# Patient Record
Sex: FEMALE | Race: WHITE | NOT HISPANIC OR LATINO | Employment: UNEMPLOYED | URBAN - METROPOLITAN AREA
[De-identification: names, ages, dates, MRNs, and addresses within clinical notes are randomized per-mention and may not be internally consistent; named-entity substitution may affect disease eponyms.]

---

## 2017-02-07 DIAGNOSIS — C50.919 MALIGNANT NEOPLASM OF FEMALE BREAST, UNSPECIFIED LATERALITY, UNSPECIFIED SITE OF BREAST: Primary | ICD-10-CM

## 2017-02-07 PROBLEM — Z45.2 ENCOUNTER FOR FITTING AND ADJUSTMENT OF VASCULAR CATHETER: Status: ACTIVE | Noted: 2017-02-07

## 2017-02-08 ENCOUNTER — LAB (OUTPATIENT)
Dept: ONCOLOGY | Facility: HOSPITAL | Age: 62
End: 2017-02-08

## 2017-02-08 ENCOUNTER — INFUSION (OUTPATIENT)
Dept: ONCOLOGY | Facility: HOSPITAL | Age: 62
End: 2017-02-08

## 2017-02-08 ENCOUNTER — CONSULT (OUTPATIENT)
Dept: ONCOLOGY | Facility: CLINIC | Age: 62
End: 2017-02-08

## 2017-02-08 VITALS
HEART RATE: 57 BPM | TEMPERATURE: 98.1 F | BODY MASS INDEX: 22.45 KG/M2 | RESPIRATION RATE: 16 BRPM | HEIGHT: 62 IN | WEIGHT: 122 LBS | DIASTOLIC BLOOD PRESSURE: 76 MMHG | SYSTOLIC BLOOD PRESSURE: 122 MMHG | OXYGEN SATURATION: 100 %

## 2017-02-08 DIAGNOSIS — C50.919 MALIGNANT NEOPLASM OF FEMALE BREAST, UNSPECIFIED LATERALITY, UNSPECIFIED SITE OF BREAST: ICD-10-CM

## 2017-02-08 DIAGNOSIS — E53.8 B12 DEFICIENCY: Primary | ICD-10-CM

## 2017-02-08 DIAGNOSIS — Z45.2 ENCOUNTER FOR FITTING AND ADJUSTMENT OF VASCULAR CATHETER: ICD-10-CM

## 2017-02-08 DIAGNOSIS — R97.0 ELEVATED CEA: ICD-10-CM

## 2017-02-08 DIAGNOSIS — C50.912 BILATERAL MALIGNANT NEOPLASM OF BREAST IN FEMALE, UNSPECIFIED SITE OF BREAST: Primary | ICD-10-CM

## 2017-02-08 DIAGNOSIS — C50.412 MALIGNANT NEOPLASM OF UPPER-OUTER QUADRANT OF LEFT FEMALE BREAST (HCC): Primary | ICD-10-CM

## 2017-02-08 DIAGNOSIS — C50.911 BILATERAL MALIGNANT NEOPLASM OF BREAST IN FEMALE, UNSPECIFIED SITE OF BREAST: Primary | ICD-10-CM

## 2017-02-08 LAB
ALBUMIN SERPL-MCNC: 4.4 G/DL (ref 3.5–5.2)
ALBUMIN/GLOB SERPL: 1.6 G/DL
ALP SERPL-CCNC: 61 U/L (ref 39–117)
ALT SERPL W P-5'-P-CCNC: 20 U/L (ref 1–33)
ANION GAP SERPL CALCULATED.3IONS-SCNC: 14 MMOL/L
AST SERPL-CCNC: 23 U/L (ref 1–32)
BASOPHILS # BLD AUTO: 0.07 10*3/MM3 (ref 0–0.2)
BASOPHILS NFR BLD AUTO: 1 % (ref 0–1.5)
BILIRUB SERPL-MCNC: 0.3 MG/DL (ref 0.1–1.2)
BUN BLD-MCNC: 22 MG/DL (ref 8–23)
BUN/CREAT SERPL: 17.9 (ref 7–25)
CALCIUM SPEC-SCNC: 9.6 MG/DL (ref 8.6–10.5)
CEA SERPL-MCNC: 1.14 NG/ML
CHLORIDE SERPL-SCNC: 97 MMOL/L (ref 98–107)
CO2 SERPL-SCNC: 24 MMOL/L (ref 22–29)
CREAT BLD-MCNC: 1.23 MG/DL (ref 0.57–1)
DEPRECATED RDW RBC AUTO: 44.3 FL (ref 37–54)
EOSINOPHIL # BLD AUTO: 0.17 10*3/MM3 (ref 0–0.7)
EOSINOPHIL NFR BLD AUTO: 2.4 % (ref 0.3–6.2)
ERYTHROCYTE [DISTWIDTH] IN BLOOD BY AUTOMATED COUNT: 12.2 % (ref 11.7–13)
GFR SERPL CREATININE-BSD FRML MDRD: 44 ML/MIN/1.73
GLOBULIN UR ELPH-MCNC: 2.8 GM/DL
GLUCOSE BLD-MCNC: 105 MG/DL (ref 65–99)
HCT VFR BLD AUTO: 36.3 % (ref 35.6–45.5)
HGB BLD-MCNC: 12.7 G/DL (ref 11.9–15.5)
IMM GRANULOCYTES # BLD: 0.08 10*3/MM3 (ref 0–0.03)
IMM GRANULOCYTES NFR BLD: 1.1 % (ref 0–0.5)
LYMPHOCYTES # BLD AUTO: 2.89 10*3/MM3 (ref 0.9–4.8)
LYMPHOCYTES NFR BLD AUTO: 40.1 % (ref 19.6–45.3)
MCH RBC QN AUTO: 34.1 PG (ref 26.9–32)
MCHC RBC AUTO-ENTMCNC: 35 G/DL (ref 32.4–36.3)
MCV RBC AUTO: 97.6 FL (ref 80.5–98.2)
MONOCYTES # BLD AUTO: 0.72 10*3/MM3 (ref 0.2–1.2)
MONOCYTES NFR BLD AUTO: 10 % (ref 5–12)
NEUTROPHILS # BLD AUTO: 3.27 10*3/MM3 (ref 1.9–8.1)
NEUTROPHILS NFR BLD AUTO: 45.4 % (ref 42.7–76)
NRBC BLD MANUAL-RTO: 0 /100 WBC (ref 0–0)
PLATELET # BLD AUTO: 202 10*3/MM3 (ref 140–500)
PMV BLD AUTO: 10 FL (ref 6–12)
POTASSIUM BLD-SCNC: 4.5 MMOL/L (ref 3.5–5.2)
PROT SERPL-MCNC: 7.2 G/DL (ref 6–8.5)
RBC # BLD AUTO: 3.72 10*6/MM3 (ref 3.9–5.2)
SODIUM BLD-SCNC: 135 MMOL/L (ref 136–145)
T4 FREE SERPL-MCNC: 1.62 NG/DL (ref 0.93–1.7)
TSH SERPL DL<=0.05 MIU/L-ACNC: 0.74 MIU/ML (ref 0.27–4.2)
VIT B12 BLD-MCNC: 546 PG/ML (ref 211–946)
WBC NRBC COR # BLD: 7.2 10*3/MM3 (ref 4.5–10.7)

## 2017-02-08 PROCEDURE — 84443 ASSAY THYROID STIM HORMONE: CPT | Performed by: INTERNAL MEDICINE

## 2017-02-08 PROCEDURE — 25010000002 CYANOCOBALAMIN PER 1000 MCG: Performed by: INTERNAL MEDICINE

## 2017-02-08 PROCEDURE — 36415 COLL VENOUS BLD VENIPUNCTURE: CPT

## 2017-02-08 PROCEDURE — 82607 VITAMIN B-12: CPT | Performed by: INTERNAL MEDICINE

## 2017-02-08 PROCEDURE — 96372 THER/PROPH/DIAG INJ SC/IM: CPT | Performed by: INTERNAL MEDICINE

## 2017-02-08 PROCEDURE — 99205 OFFICE O/P NEW HI 60 MIN: CPT | Performed by: INTERNAL MEDICINE

## 2017-02-08 PROCEDURE — 25010000002 HEPARIN FLUSH (PORCINE) 100 UNIT/ML SOLUTION: Performed by: INTERNAL MEDICINE

## 2017-02-08 PROCEDURE — 85025 COMPLETE CBC W/AUTO DIFF WBC: CPT | Performed by: INTERNAL MEDICINE

## 2017-02-08 PROCEDURE — 80053 COMPREHEN METABOLIC PANEL: CPT | Performed by: INTERNAL MEDICINE

## 2017-02-08 PROCEDURE — 82378 CARCINOEMBRYONIC ANTIGEN: CPT | Performed by: INTERNAL MEDICINE

## 2017-02-08 PROCEDURE — 84439 ASSAY OF FREE THYROXINE: CPT | Performed by: INTERNAL MEDICINE

## 2017-02-08 PROCEDURE — 96523 IRRIG DRUG DELIVERY DEVICE: CPT | Performed by: INTERNAL MEDICINE

## 2017-02-08 RX ORDER — CYANOCOBALAMIN 1000 UG/ML
1000 INJECTION, SOLUTION INTRAMUSCULAR; SUBCUTANEOUS ONCE
Status: COMPLETED | OUTPATIENT
Start: 2017-02-08 | End: 2017-02-08

## 2017-02-08 RX ORDER — HYDROCODONE BITARTRATE AND ACETAMINOPHEN 5; 325 MG/1; MG/1
TABLET ORAL
COMMUNITY
Start: 2017-02-07 | End: 2018-02-14

## 2017-02-08 RX ORDER — SODIUM CHLORIDE 0.9 % (FLUSH) 0.9 %
10 SYRINGE (ML) INJECTION AS NEEDED
Status: DISCONTINUED | OUTPATIENT
Start: 2017-02-08 | End: 2017-02-08 | Stop reason: HOSPADM

## 2017-02-08 RX ORDER — METOPROLOL TARTRATE 50 MG/1
TABLET, FILM COATED ORAL
Refills: 0 | COMMUNITY
Start: 2016-12-12

## 2017-02-08 RX ORDER — CYANOCOBALAMIN 1000 UG/ML
1000 INJECTION, SOLUTION INTRAMUSCULAR; SUBCUTANEOUS ONCE
Status: CANCELLED | OUTPATIENT
Start: 2017-03-08

## 2017-02-08 RX ORDER — LISINOPRIL AND HYDROCHLOROTHIAZIDE 12.5; 1 MG/1; MG/1
TABLET ORAL
COMMUNITY
Start: 2016-11-17

## 2017-02-08 RX ADMIN — SODIUM CHLORIDE, PRESERVATIVE FREE 500 UNITS: 5 INJECTION INTRAVENOUS at 14:35

## 2017-02-08 RX ADMIN — CYANOCOBALAMIN 1000 MCG: 1000 INJECTION, SOLUTION INTRAMUSCULAR; SUBCUTANEOUS at 14:57

## 2017-02-08 RX ADMIN — Medication 10 ML: at 14:35
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
## 2017-02-09 ENCOUNTER — DOCUMENTATION (OUTPATIENT)
Dept: ONCOLOGY | Facility: CLINIC | Age: 62
End: 2017-02-09
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
## 2017-03-02 ENCOUNTER — TELEPHONE (OUTPATIENT)
Dept: ONCOLOGY | Facility: CLINIC | Age: 62
End: 2017-03-02

## 2017-03-02 RX ORDER — PROCHLORPERAZINE MALEATE 10 MG
10 TABLET ORAL EVERY 6 HOURS PRN
Qty: 30 TABLET | Refills: 1 | Status: SHIPPED | OUTPATIENT
Start: 2017-03-02 | End: 2017-04-12

## 2017-03-02 NOTE — TELEPHONE ENCOUNTER
----- Message from Chantelle Bradley sent at 3/2/2017  9:01 AM EST -----   Pt is also having stomach pain and nausea and wants to know if MD would call something in for her        [phone]

## 2017-03-02 NOTE — TELEPHONE ENCOUNTER
----- Message from Chantelle Bradley sent at 3/2/2017  8:58 AM EST -----   Pt says she had an abnormal Mammogram and was sent a letter for additional testing done        [phone]

## 2017-03-02 NOTE — TELEPHONE ENCOUNTER
Pt states she is having some problems with nausea and stomach discomfort.  Started about 2 days a go.  No vomiting, but feels if she could vomit she would feel better.  Afebrile. Having normal bm's.  Denies chills, or aches.   Does have some lower back pain which isn't anything new.  Has a hx of lower back pain.  Pt. Is finished with all her treatments including a yr of herceptin.  Requesting something for pain and nausea.  States she also had a mammogram which she was told was abnormal and additional testing needed to be done.  Informed pt. We do not have the mammogram report.  All d/w dr. Taylor, pt. Instructed to get a otc probiotic to take daily.  Will e-scribe pt. Compazine to [facility] in [place].  Suggest she contact her pcp for the chronic back pain.  Will make sure dr. Taylor sees the mammogram when it comes over.  Has appt with dr. Taylor on 4/12/17.

## 2017-03-13 ENCOUNTER — TELEPHONE (OUTPATIENT)
Dept: ONCOLOGY | Facility: HOSPITAL | Age: 62
End: 2017-03-13

## 2017-03-13 DIAGNOSIS — R92.8 ABNORMAL MAMMOGRAM: Primary | ICD-10-CM

## 2017-03-13 NOTE — TELEPHONE ENCOUNTER
----- Message from Jennie Christiansen sent at 3/13/2017  9:38 AM EDT -----  Contact: [phone] [facility]   Pt needs scheduled for additional views mammo.      Spoke with [facility]. Pt has not been back for follow up on abnormal mammogram. [facility] states pt needs additional views. Orders placed. Message sent to scheduling to call jose juan Rubio.

## 2017-03-23 ENCOUNTER — TELEPHONE (OUTPATIENT)
Dept: ONCOLOGY | Facility: HOSPITAL | Age: 62
End: 2017-03-23

## 2017-03-23 NOTE — TELEPHONE ENCOUNTER
Patient calling to see if Dr Taylor received mammogram results from two days ago? Pt says radiologist/PCP ordered a biopsy because of it, but she didn't know why we weren't ordering it.   I told her it doesn't look like we received it. Patient will call [facility] and have them fax us mammogram results. If Dr Taylor could review tomorrow when we receive them so we can let patient know what Dr Taylor thinks about it? Biopsy is scheduled next week.  Also, patient has issues with nausea. We had prescribed compazine for her but she said it didn't help and she didn't like the way it made her feel. Patient thinks the nausea could be related to her nerves from all of this testing. She has some anxiety. Should we prescribe her something different for nausea or anxiety medication? Or should she discuss with PCP?   [employer] message sent to Dr Taylor.

## 2017-03-23 NOTE — TELEPHONE ENCOUNTER
----- Message from Vinicius Calderón sent at 3/23/2017 12:25 PM EDT -----  Contact: self  Pt is calling because her pcp ordered mamm a t back and seen that they seen  something on it . she was thinking that dr oneil was ordering everything for her.        [phone]

## 2017-03-27 ENCOUNTER — TELEPHONE (OUTPATIENT)
Dept: ONCOLOGY | Facility: HOSPITAL | Age: 62
End: 2017-03-27

## 2017-03-27 DIAGNOSIS — C50.412 MALIGNANT NEOPLASM OF UPPER-OUTER QUADRANT OF LEFT FEMALE BREAST (HCC): Primary | ICD-10-CM

## 2017-03-27 RX ORDER — ONDANSETRON 4 MG/1
4 TABLET, FILM COATED ORAL EVERY 6 HOURS
Qty: 30 TABLET | Refills: 3 | Status: SHIPPED | OUTPATIENT
Start: 2017-03-27 | End: 2018-02-14

## 2017-03-27 NOTE — TELEPHONE ENCOUNTER
Called and spoke with patient to let her know we are working on getting results. Also, let her know we prescribed zofran for her nausea. She v/u Patient will have b/x this week.  Message forwarded to Marianne to obtain results.

## 2017-03-27 NOTE — TELEPHONE ENCOUNTER
----- Message from Ifeanyi Taylor MD sent at 3/24/2017  5:01 PM EDT -----  I haven't seen the diagnostic mammogram result.  Can rx zofran 4mg q6h prn #30 with three refills.  Might talk to Marianne Salgado about getting the diagnostic mammo result but if a bx has already been ordered then that's all that's needed.  Thanks,  [facility]    ----- Message -----     From: Alexsandra Jarrett RN     Sent: 3/23/2017   3:03 PM       To: MD Dr Brandon Plummer, Patient calling to see if you received mammogram results from two days ago? Pt says radiologist/PCP ordered a biopsy because of it, but she didn't know why you weren't ordering it.   I told her it doesn't look like we received it. Patient will call [facility] and have them fax us mammogram results. If you could review tomorrow when we receive them so we can let patient know what you think about it? Biopsy is scheduled next week.  Also, patient has issues with nausea. We had prescribed compazine for her but she said it didn't help and she didn't like the way it made her feel. Patient thinks the nausea could be related to her nerves from all of this testing. She has some anxiety. Should we prescribe her something different for nausea or anxiety medication? Or should she discuss with PCP?

## 2017-04-04 ENCOUNTER — TELEPHONE (OUTPATIENT)
Dept: ONCOLOGY | Facility: CLINIC | Age: 62
End: 2017-04-04

## 2017-04-04 NOTE — TELEPHONE ENCOUNTER
----- Message from Marianne Salgado sent at 4/4/2017  8:29 AM EDT -----  Contact: self   Patient calling to get path results from biopsy and also is overdue for port flush    Patient notified per Dr. Taylor that biopsy results WNL.  Port flush added to next weeks appt.  She has further questions regarding scans that she will review with Dr. Taylor next week at appt.

## 2017-04-12 ENCOUNTER — OFFICE VISIT (OUTPATIENT)
Dept: ONCOLOGY | Facility: CLINIC | Age: 62
End: 2017-04-12

## 2017-04-12 ENCOUNTER — APPOINTMENT (OUTPATIENT)
Dept: OTHER | Facility: HOSPITAL | Age: 62
End: 2017-04-12

## 2017-04-12 ENCOUNTER — INFUSION (OUTPATIENT)
Dept: ONCOLOGY | Facility: HOSPITAL | Age: 62
End: 2017-04-12

## 2017-04-12 VITALS
HEART RATE: 82 BPM | RESPIRATION RATE: 12 BRPM | BODY MASS INDEX: 24.55 KG/M2 | DIASTOLIC BLOOD PRESSURE: 79 MMHG | HEIGHT: 62 IN | SYSTOLIC BLOOD PRESSURE: 126 MMHG | WEIGHT: 133.4 LBS | OXYGEN SATURATION: 99 % | TEMPERATURE: 98 F

## 2017-04-12 DIAGNOSIS — N28.1 RENAL CYST: ICD-10-CM

## 2017-04-12 DIAGNOSIS — Z45.2 ENCOUNTER FOR FITTING AND ADJUSTMENT OF VASCULAR CATHETER: Primary | ICD-10-CM

## 2017-04-12 DIAGNOSIS — C50.412 MALIGNANT NEOPLASM OF UPPER-OUTER QUADRANT OF LEFT FEMALE BREAST (HCC): Primary | ICD-10-CM

## 2017-04-12 DIAGNOSIS — E53.8 B12 DEFICIENCY: ICD-10-CM

## 2017-04-12 PROCEDURE — 25010000002 HEPARIN FLUSH (PORCINE) 100 UNIT/ML SOLUTION: Performed by: INTERNAL MEDICINE

## 2017-04-12 PROCEDURE — 96372 THER/PROPH/DIAG INJ SC/IM: CPT | Performed by: INTERNAL MEDICINE

## 2017-04-12 PROCEDURE — 99213 OFFICE O/P EST LOW 20 MIN: CPT | Performed by: INTERNAL MEDICINE

## 2017-04-12 PROCEDURE — 25010000002 CYANOCOBALAMIN PER 1000 MCG: Performed by: INTERNAL MEDICINE

## 2017-04-12 RX ORDER — ROPINIROLE 0.5 MG/1
TABLET, FILM COATED ORAL
Refills: 0 | COMMUNITY
Start: 2017-02-02 | End: 2017-04-12

## 2017-04-12 RX ORDER — ATORVASTATIN CALCIUM 10 MG/1
20 TABLET, FILM COATED ORAL
COMMUNITY
Start: 2017-04-10

## 2017-04-12 RX ORDER — CYANOCOBALAMIN 1000 UG/ML
1000 INJECTION, SOLUTION INTRAMUSCULAR; SUBCUTANEOUS ONCE
Status: COMPLETED | OUTPATIENT
Start: 2017-04-12 | End: 2017-04-12

## 2017-04-12 RX ORDER — CYANOCOBALAMIN 1000 UG/ML
1000 INJECTION, SOLUTION INTRAMUSCULAR; SUBCUTANEOUS ONCE
Status: CANCELLED | OUTPATIENT
Start: 2017-05-03

## 2017-04-12 RX ORDER — SODIUM CHLORIDE 0.9 % (FLUSH) 0.9 %
10 SYRINGE (ML) INJECTION AS NEEDED
Status: DISCONTINUED | OUTPATIENT
Start: 2017-04-12 | End: 2017-04-12 | Stop reason: HOSPADM

## 2017-04-12 RX ORDER — NITROGLYCERIN 0.4 MG/1
TABLET SUBLINGUAL
COMMUNITY
Start: 2015-10-14

## 2017-04-12 RX ADMIN — Medication 10 ML: at 14:18

## 2017-04-12 RX ADMIN — CYANOCOBALAMIN 1000 MCG: 1000 INJECTION, SOLUTION INTRAMUSCULAR; SUBCUTANEOUS at 14:15

## 2017-04-12 RX ADMIN — SODIUM CHLORIDE, PRESERVATIVE FREE 500 UNITS: 5 INJECTION INTRAVENOUS at 14:19

## 2017-04-12 NOTE — PROGRESS NOTES
[facility] GROUP OUTPATIENT FOLLOW UP CLINIC VISIT    REASON FOR FOLLOW-UP:    1.  History of stage IIB ER/MI negative, HER-2 overexpressed ductal carcinoma of the left breast with 1 node positive for a micrometastasis. Following lumpectomy, she completed adjuvant radiation and three cycles chemotherapy with Taxotere, carboplatin, and Herceptin and subsequently completed 1 year of Herceptin. She was followed by Dr. David Harding with the [facility].  2.  Abnormal mammogram at [facility] on 2/23/2017 with diagnostic imaging recommended.  Diagnostic left mammogram performed on 3/21/2017.  Some architectural distortion seen in the upper outer left breast with 3 groupings of calcifications within the lumpectomy bed.  Biopsy was recommended and performed showing fat necrosis.  3.  Persistently elevated CEA level on prior labs at [place].  CEA was normal here at 1.14 on 2/8/2017.      HISTORY OF PRESENT ILLNESS:  Pricilla Molina is a 62 y.o. female who returns today for follow up of the above issue.  She had breast imaging performed within the past couple of months that showed some calcifications at the lumpectomy site.  Ultimately a biopsy was performed showing fat necrosis.    She was quite anxious regarding her abnormal imaging and subsequent biopsy.  She is feeling better.  She continues to have a little bit of low back pain but this is improved.  She has been having some nausea and dyspepsia.  She was intolerant of Compazine but ondansetron did help her.  She has not tried an H2 blocker.  She does take occasional Tums.  She states that her symptoms are overall much better.        ONCOLOGIC HISTORY:  She had diagnostic left breast imaging on 10/9/2015 that showed a 1.9 cm mass in the upper outer quadrant of the left breast. A biopsy was performed on 10/19/2015 showing invasive ductal carcinoma, grade 3 and poorly differentiated. Estrogen receptor and progesterone  receptor were negative with a Ki-67 of 30%. HER-2 was overexpressed by FISH. On 10/29/2015 she had a left breast lumpectomy with sentinel lymph node biopsy. This showed a 22 mm grade 3 poorly differentiated invasive ductal carcinoma with a microscopic focus of high-grade DCIS noted. Lymphovascular invasion negative. Three lymph nodes examined with one lymph node positive for a micrometastasis. pT2, pN1 (mi). On 11/11/2015 Mediport was placed by Dr. Casie Abrams. On 12/3/2015 she initiated adjuvant chemotherapy with Taxotere, carboplatin, and Herceptin. She was hospitalized at [facility] for dehydration, hypotension, and acute kidney injury from 12/14/2015 through 12/15/2015. Carboplatin dose was decreased by 20%. She received cycle #3 on 1/19/2016. She was admitted to [facility] on 1/26/2016 through 2/1/2016. She completed radiation from 4/27/2016 through 6/14/2016. From 7/17/2016 through 7/19/2016 she was admitted to [facility] for chest pain and suicidal ideation. She had a heart catheterization that showed moderate coronary artery disease in the mid LAD of 60-70% and severity. On 7/22/2016 she had CT imaging of the chest abdomen and pelvis that showed no evidence for metastatic disease. She completed Herceptin on 11/15/2016. CT imaging on 12/7/2016 showed no evidence for metastatic disease. A lesion in the left upper renal pole was noted, suspected to represent a hemorrhagic cyst.  This had decreased in size from 1.9 cm to 1.2 cm.    She completed Herceptin as of 12/13/2016.    She had a bone scan on 12/30/2016 that showed no evidence for metastatic disease.     PAST MEDICAL, SURGICAL, FAMILY, AND SOCIAL HISTORIES were reviewed with the patient and in the electronic medical record, and were updated if indicated.    ALLERGIES:  Allergies   Allergen Reactions   • Codeine Nausea Only     STOMACH ACHE   • Morphine Hives   • Sulfa Antibiotics  "Other (See Comments)     AS A CHILD       MEDICATIONS:  The medication list has been reviewed with the patient by the medical assistant, and the list has been updated in the electronic medical record, which I reviewed.  Medication dosages and frequencies were confirmed to be accurate.    REVIEW OF SYSTEMS:  PAIN:  See Vital Signs below.  GENERAL:  No fevers, chills, night sweats, or unintended weight loss.  SKIN:  No rashes or non-healing lesions.  HEME/LYMPH:  No abnormal bleeding.  No palpable lymphadenopathy.  EYES:  No vision changes or diplopia.  ENT:  No sore throat or difficulty swallowing.  RESPIRATORY:  No cough, shortness of breath, hemoptysis, or wheezing.  CARDIOVASCULAR:  No chest pain, palpitations, orthopnea, or dyspnea on exertion.  GASTROINTESTINAL:  See history of present illness  GENITOURINARY:  No dysuria or hematuria.  MUSCULOSKELETAL:  Mild back pain which is improving  NEUROLOGIC:  No dizziness, loss of consciousness, or seizures.  PSYCHIATRIC:  Anxiety which has improved    Vitals:    04/12/17 1343   BP: 126/79   Pulse: 82   Resp: 12   Temp: 98 °F (36.7 °C)   SpO2: 99%   Weight: 133 lb 6.4 oz (60.5 kg)   Height: 62.2\" (158 cm)   PainSc:   1   PainLoc: Head       PHYSICAL EXAMINATION:  GENERAL:  Well-developed well-nourished female; awake, alert and oriented, in no acute distress.  SKIN:  Warm and dry, without rashes, purpura, or petechiae.  HEAD:  Normocephalic, atraumatic.  EYES:  Pupils equal, round and reactive to light.  Extraocular movements intact.  Conjunctivae normal.  EARS:  Hearing intact.  NOSE:  Septum midline.  No excoriations or nasal discharge.  MOUTH:  No stomatitis or ulcers.  Lips are normal.  THROAT:  Oropharynx without lesions or exudates.  NECK:  Supple with good range of motion; no thyromegaly or masses; no JVD or bruits.  LYMPHATICS:  No cervical, supraclavicular, axillary, or inguinal lymphadenopathy.  CHEST:  Lungs are clear to auscultation bilaterally.  No wheezes, " rales, or rhonchi.  A Mediport is present as benign in appearance.  HEART:  Regular rate; normal rhythm.  No murmurs, gallops or rubs.  ABDOMEN:  Soft, non-tender, non-distended.  Normal active bowel sounds.  No organomegaly.  EXTREMITIES:  No clubbing, cyanosis, or edema.  NEUROLOGICAL:  No focal neurologic deficits.  Breasts not examined today    DIAGNOSTIC DATA:  Lab Results   Component Value Date    WBC 7.20 02/08/2017    HGB 12.7 02/08/2017    HCT 36.3 02/08/2017    MCV 97.6 02/08/2017     02/08/2017       IMAGING:  None reviewed    ASSESSMENT:  This is a 62 y.o. female with:  1. History of stage IIB hormone receptor negative HER-2 overexpressed invasive ductal carcinoma of the left breast she had a lumpectomy with sentinel lymph node biopsy. She completed radiation therapy. She completed 3 cycles of adjuvant chemotherapy with Taxotere and carboplatin with Herceptin before discontinuing the medication due to poor tolerance. She subsequently completed 1 year of Herceptin as of 12/13/2016. No antiestrogen therapy is indicated. She had her therapy at the [facility]. She has transferred her care to our group for ongoing monitoring.  We do not plan for any routine surveillance imaging but see below.  2.  Persistently elevated CEA level: These were obtained all she was on therapy.  I believe these were falsely elevated perhaps due to therapy as her CEA is normal now.  3.  Small left renal mass decreasing in size on her last imaging.  Significance undetermined.  We will obtain CT imaging of the abdomen with renal protocol in about 3 months before I see her next.  She states that she is intolerant of MRIs.  4.  Anxiety, improving  5.  Recent abnormalities on breast imaging resulting in a biopsy of the left breast showing fat necrosis.  We will obtain a repeat diagnostic mammogram of the left breast in 6 months.  6.  Mediport in the right subclavian area: She was to have this removed by Dr. Jason  Aliza but canceled this secondary to the abnormal breast imaging and biopsy.  We will flush her port today.  I advised her to follow-up with Dr. Abrams to have this removed.  If this cannot be removed in the next 4-6 weeks she will again return for a port flush.  7.  Dyspepsia: She will start an H2 blocker if necessary.    PLAN:  1.  Port flush today.  She will see Dr. Abrams to have this removed.  2.  CT abdomen with renal protocol in about 3 months.  This can be done at [facility] if desired.  3.  I will see her back in 3 months for follow-up with labs and a breast examination to review CT imaging.  4.  Diagnostic imaging of the left breast in about 6 months.

## 2017-06-06 ENCOUNTER — INFUSION (OUTPATIENT)
Dept: ONCOLOGY | Facility: HOSPITAL | Age: 62
End: 2017-06-06

## 2017-06-06 DIAGNOSIS — E53.8 B12 DEFICIENCY: ICD-10-CM

## 2017-06-06 DIAGNOSIS — Z45.2 ENCOUNTER FOR FITTING AND ADJUSTMENT OF VASCULAR CATHETER: Primary | ICD-10-CM

## 2017-06-06 PROCEDURE — 96372 THER/PROPH/DIAG INJ SC/IM: CPT | Performed by: NURSE PRACTITIONER

## 2017-06-06 PROCEDURE — 25010000002 HEPARIN FLUSH (PORCINE) 100 UNIT/ML SOLUTION: Performed by: NURSE PRACTITIONER

## 2017-06-06 PROCEDURE — 25010000002 CYANOCOBALAMIN PER 1000 MCG: Performed by: NURSE PRACTITIONER

## 2017-06-06 RX ORDER — SODIUM CHLORIDE 0.9 % (FLUSH) 0.9 %
10 SYRINGE (ML) INJECTION AS NEEDED
Status: DISCONTINUED | OUTPATIENT
Start: 2017-06-06 | End: 2017-06-06 | Stop reason: HOSPADM

## 2017-06-06 RX ORDER — CYANOCOBALAMIN 1000 UG/ML
1000 INJECTION, SOLUTION INTRAMUSCULAR; SUBCUTANEOUS ONCE
Status: CANCELLED | OUTPATIENT
Start: 2017-06-06

## 2017-06-06 RX ORDER — CYANOCOBALAMIN 1000 UG/ML
1000 INJECTION, SOLUTION INTRAMUSCULAR; SUBCUTANEOUS ONCE
Status: COMPLETED | OUTPATIENT
Start: 2017-06-06 | End: 2017-06-06

## 2017-06-06 RX ADMIN — SODIUM CHLORIDE, PRESERVATIVE FREE 500 UNITS: 5 INJECTION INTRAVENOUS at 09:56

## 2017-06-06 RX ADMIN — CYANOCOBALAMIN 1000 MCG: 1000 INJECTION, SOLUTION INTRAMUSCULAR; SUBCUTANEOUS at 10:09

## 2017-06-06 RX ADMIN — Medication 10 ML: at 09:56

## 2017-07-14 ENCOUNTER — HOSPITAL ENCOUNTER (OUTPATIENT)
Dept: CT IMAGING | Facility: HOSPITAL | Age: 62
Discharge: HOME | End: 2017-07-14
Attending: INTERNAL MEDICINE | Admitting: INTERNAL MEDICINE

## 2017-07-14 ENCOUNTER — INFUSION (OUTPATIENT)
Dept: ONCOLOGY | Facility: HOSPITAL | Age: 62
End: 2017-07-14

## 2017-07-14 DIAGNOSIS — E53.8 B12 DEFICIENCY: ICD-10-CM

## 2017-07-14 DIAGNOSIS — N28.1 RENAL CYST: ICD-10-CM

## 2017-07-14 DIAGNOSIS — Z45.2 ENCOUNTER FOR FITTING AND ADJUSTMENT OF VASCULAR CATHETER: Primary | ICD-10-CM

## 2017-07-14 DIAGNOSIS — C50.412 MALIGNANT NEOPLASM OF UPPER-OUTER QUADRANT OF LEFT FEMALE BREAST (HCC): ICD-10-CM

## 2017-07-14 LAB
ALBUMIN SERPL-MCNC: 4.3 G/DL (ref 3.5–5.2)
ALBUMIN/GLOB SERPL: 1.7 G/DL
ALP SERPL-CCNC: 55 U/L (ref 39–117)
ALT SERPL W P-5'-P-CCNC: 22 U/L (ref 1–33)
ANION GAP SERPL CALCULATED.3IONS-SCNC: 12.4 MMOL/L
AST SERPL-CCNC: 26 U/L (ref 1–32)
BASOPHILS # BLD AUTO: 0.05 10*3/MM3 (ref 0–0.2)
BASOPHILS NFR BLD AUTO: 1.1 % (ref 0–1.5)
BILIRUB SERPL-MCNC: 0.4 MG/DL (ref 0.1–1.2)
BUN BLD-MCNC: 19 MG/DL (ref 8–23)
BUN/CREAT SERPL: 16.7 (ref 7–25)
CALCIUM SPEC-SCNC: 9.4 MG/DL (ref 8.6–10.5)
CHLORIDE SERPL-SCNC: 101 MMOL/L (ref 98–107)
CO2 SERPL-SCNC: 23.6 MMOL/L (ref 22–29)
CREAT BLD-MCNC: 1.14 MG/DL (ref 0.57–1)
CREAT BLDA-MCNC: 1.1 MG/DL (ref 0.6–1.3)
DEPRECATED RDW RBC AUTO: 47.6 FL (ref 37–54)
EOSINOPHIL # BLD AUTO: 0.22 10*3/MM3 (ref 0–0.7)
EOSINOPHIL NFR BLD AUTO: 4.8 % (ref 0.3–6.2)
ERYTHROCYTE [DISTWIDTH] IN BLOOD BY AUTOMATED COUNT: 12.9 % (ref 11.7–13)
GFR SERPL CREATININE-BSD FRML MDRD: 48 ML/MIN/1.73
GLOBULIN UR ELPH-MCNC: 2.6 GM/DL
GLUCOSE BLD-MCNC: 98 MG/DL (ref 65–99)
HCT VFR BLD AUTO: 34.3 % (ref 35.6–45.5)
HGB BLD-MCNC: 11.7 G/DL (ref 11.9–15.5)
IMM GRANULOCYTES # BLD: 0.01 10*3/MM3 (ref 0–0.03)
IMM GRANULOCYTES NFR BLD: 0.2 % (ref 0–0.5)
LYMPHOCYTES # BLD AUTO: 2.3 10*3/MM3 (ref 0.9–4.8)
LYMPHOCYTES NFR BLD AUTO: 49.7 % (ref 19.6–45.3)
MCH RBC QN AUTO: 34.1 PG (ref 26.9–32)
MCHC RBC AUTO-ENTMCNC: 34.1 G/DL (ref 32.4–36.3)
MCV RBC AUTO: 100 FL (ref 80.5–98.2)
MONOCYTES # BLD AUTO: 0.56 10*3/MM3 (ref 0.2–1.2)
MONOCYTES NFR BLD AUTO: 12.1 % (ref 5–12)
NEUTROPHILS # BLD AUTO: 1.49 10*3/MM3 (ref 1.9–8.1)
NEUTROPHILS NFR BLD AUTO: 32.1 % (ref 42.7–76)
NRBC BLD MANUAL-RTO: 0 /100 WBC (ref 0–0)
PLATELET # BLD AUTO: 169 10*3/MM3 (ref 140–500)
PMV BLD AUTO: 11.1 FL (ref 6–12)
POTASSIUM BLD-SCNC: 4.7 MMOL/L (ref 3.5–5.2)
PROT SERPL-MCNC: 6.9 G/DL (ref 6–8.5)
RBC # BLD AUTO: 3.43 10*6/MM3 (ref 3.9–5.2)
SODIUM BLD-SCNC: 137 MMOL/L (ref 136–145)
WBC NRBC COR # BLD: 4.63 10*3/MM3 (ref 4.5–10.7)

## 2017-07-14 PROCEDURE — 25010000002 CYANOCOBALAMIN PER 1000 MCG: Performed by: INTERNAL MEDICINE

## 2017-07-14 PROCEDURE — 80053 COMPREHEN METABOLIC PANEL: CPT | Performed by: INTERNAL MEDICINE

## 2017-07-14 PROCEDURE — 96372 THER/PROPH/DIAG INJ SC/IM: CPT | Performed by: INTERNAL MEDICINE

## 2017-07-14 PROCEDURE — 74170 CT ABD WO CNTRST FLWD CNTRST: CPT

## 2017-07-14 PROCEDURE — 85025 COMPLETE CBC W/AUTO DIFF WBC: CPT | Performed by: INTERNAL MEDICINE

## 2017-07-14 PROCEDURE — 0 DIATRIZOATE MEGLUMINE & SODIUM PER 1 ML: Performed by: INTERNAL MEDICINE

## 2017-07-14 PROCEDURE — 82565 ASSAY OF CREATININE: CPT

## 2017-07-14 PROCEDURE — 0 IOPAMIDOL 61 % SOLUTION: Performed by: INTERNAL MEDICINE

## 2017-07-14 RX ORDER — SODIUM CHLORIDE 0.9 % (FLUSH) 0.9 %
10 SYRINGE (ML) INJECTION AS NEEDED
Status: DISCONTINUED | OUTPATIENT
Start: 2017-07-14 | End: 2017-07-14 | Stop reason: HOSPADM

## 2017-07-14 RX ORDER — CYANOCOBALAMIN 1000 UG/ML
1000 INJECTION, SOLUTION INTRAMUSCULAR; SUBCUTANEOUS ONCE
OUTPATIENT
Start: 2017-07-14

## 2017-07-14 RX ORDER — CYANOCOBALAMIN 1000 UG/ML
1000 INJECTION, SOLUTION INTRAMUSCULAR; SUBCUTANEOUS ONCE
Status: COMPLETED | OUTPATIENT
Start: 2017-07-14 | End: 2017-07-14

## 2017-07-14 RX ADMIN — IOPAMIDOL 85 ML: 612 INJECTION, SOLUTION INTRAVENOUS at 11:57

## 2017-07-14 RX ADMIN — DIATRIZOATE MEGLUMINE AND DIATRIZOATE SODIUM 30 ML: 660; 100 LIQUID ORAL; RECTAL at 10:30

## 2017-07-14 RX ADMIN — CYANOCOBALAMIN 1000 MCG: 1000 INJECTION, SOLUTION INTRAMUSCULAR; SUBCUTANEOUS at 11:22

## 2017-07-14 RX ADMIN — Medication 10 ML: at 11:20

## 2017-07-14 NOTE — PROGRESS NOTES
Pt here to have [facility] accessed for CT scan.  Pt is scheduled to see Dr. Taylor next week and wanting to know if I can go ahead and draw labs since [facility]?  I explained to patient that I will draw labs and let Dr. Taylor know- most likely this will be ok to use for next week, but possibility he will want them again if something is abnormal.  She v/u.  Labs drawn and message on appt line for next week to verify with Dr. Taylor.  Pt also requesting her vitamin B12 injection today.  There is an order already in for this and she is due for this per Dr. Milan dictation.  Vitamin B12 injection given today.

## 2017-07-19 ENCOUNTER — APPOINTMENT (OUTPATIENT)
Dept: OTHER | Facility: HOSPITAL | Age: 62
End: 2017-07-19

## 2017-07-19 ENCOUNTER — OFFICE VISIT (OUTPATIENT)
Dept: ONCOLOGY | Facility: CLINIC | Age: 62
End: 2017-07-19

## 2017-07-19 ENCOUNTER — APPOINTMENT (OUTPATIENT)
Dept: ONCOLOGY | Facility: CLINIC | Age: 62
End: 2017-07-19

## 2017-07-19 VITALS
OXYGEN SATURATION: 100 % | HEART RATE: 56 BPM | HEIGHT: 62 IN | DIASTOLIC BLOOD PRESSURE: 74 MMHG | WEIGHT: 134 LBS | BODY MASS INDEX: 24.66 KG/M2 | TEMPERATURE: 98.2 F | RESPIRATION RATE: 12 BRPM | SYSTOLIC BLOOD PRESSURE: 145 MMHG

## 2017-07-19 DIAGNOSIS — Z85.3 HISTORY OF BREAST CANCER: Primary | ICD-10-CM

## 2017-07-19 PROCEDURE — 99214 OFFICE O/P EST MOD 30 MIN: CPT | Performed by: INTERNAL MEDICINE

## 2017-07-19 PROCEDURE — G0463 HOSPITAL OUTPT CLINIC VISIT: HCPCS | Performed by: INTERNAL MEDICINE

## 2017-07-19 NOTE — PROGRESS NOTES
[facility] GROUP OUTPATIENT FOLLOW UP CLINIC VISIT    REASON FOR FOLLOW-UP:    1.  History of stage IIB ER/IN negative, HER-2 overexpressed ductal carcinoma of the left breast with 1 node positive for a micrometastasis. Following lumpectomy, she completed adjuvant radiation and three cycles chemotherapy with Taxotere, carboplatin, and Herceptin and subsequently completed 1 year of Herceptin. She was followed by Dr. David Harding with the [facility].  2.  Abnormal mammogram at [facility] on 2/23/2017 with diagnostic imaging recommended.  Diagnostic left mammogram performed on 3/21/2017.  Some architectural distortion seen in the upper outer left breast with 3 groupings of calcifications within the lumpectomy bed.  Biopsy was recommended and performed showing fat necrosis.  3.  Persistently elevated CEA level on prior labs at [place].  CEA was normal here at 1.14 on 2/8/2017.      HISTORY OF PRESENT ILLNESS:  Pricilla Molina is a 62 y.o. female who returns today for follow up of the above issues.  She is doing well.  She has not yet had her mediport removed.  No new issues otherwise.  No new problems with her breasts.      ONCOLOGIC HISTORY:  She had diagnostic left breast imaging on 10/9/2015 that showed a 1.9 cm mass in the upper outer quadrant of the left breast. A biopsy was performed on 10/19/2015 showing invasive ductal carcinoma, grade 3 and poorly differentiated. Estrogen receptor and progesterone receptor were negative with a Ki-67 of 30%. HER-2 was overexpressed by FISH. On 10/29/2015 she had a left breast lumpectomy with sentinel lymph node biopsy. This showed a 22 mm grade 3 poorly differentiated invasive ductal carcinoma with a microscopic focus of high-grade DCIS noted. Lymphovascular invasion negative. Three lymph nodes examined with one lymph node positive for a micrometastasis. pT2, pN1 (mi). On 11/11/2015 Mediport was placed by Dr. Casie Abrams. On  12/3/2015 she initiated adjuvant chemotherapy with Taxotere, carboplatin, and Herceptin. She was hospitalized at [facility] for dehydration, hypotension, and acute kidney injury from 12/14/2015 through 12/15/2015. Carboplatin dose was decreased by 20%. She received cycle #3 on 1/19/2016. She was admitted to [facility] on 1/26/2016 through 2/1/2016. She completed radiation from 4/27/2016 through 6/14/2016. From 7/17/2016 through 7/19/2016 she was admitted to [facility] for chest pain and suicidal ideation. She had a heart catheterization that showed moderate coronary artery disease in the mid LAD of 60-70% and severity. On 7/22/2016 she had CT imaging of the chest abdomen and pelvis that showed no evidence for metastatic disease. She completed Herceptin on 11/15/2016. CT imaging on 12/7/2016 showed no evidence for metastatic disease. A lesion in the left upper renal pole was noted, suspected to represent a hemorrhagic cyst.  This had decreased in size from 1.9 cm to 1.2 cm.    She completed Herceptin as of 12/13/2016.    She had a bone scan on 12/30/2016 that showed no evidence for metastatic disease.     She had abnormal imaging of the left breast in March, 2017 ultimately with a biopsy showing fat necrosis.      As of 7/19/2017 she has not had her mediport removed as she has not followed up with Dr. Abrams.      PAST MEDICAL, SURGICAL, FAMILY, AND SOCIAL HISTORIES were reviewed with the patient and in the electronic medical record, and were updated if indicated.    ALLERGIES:  Allergies   Allergen Reactions   • Codeine Nausea Only     STOMACH ACHE   • Morphine Hives   • Sulfa Antibiotics Other (See Comments)     AS A CHILD       MEDICATIONS:  The medication list has been reviewed with the patient by the medical assistant, and the list has been updated in the electronic medical record, which I reviewed.  Medication dosages and frequencies  "were confirmed to be accurate.    REVIEW OF SYSTEMS:  PAIN:  See Vital Signs below.  GENERAL:  No fevers, chills, night sweats, or unintended weight loss.  SKIN:  No rashes or non-healing lesions.  HEME/LYMPH:  No abnormal bleeding.  No palpable lymphadenopathy.  EYES:  No vision changes or diplopia.  ENT:  No sore throat or difficulty swallowing.  RESPIRATORY:  No cough, shortness of breath, hemoptysis, or wheezing.  CARDIOVASCULAR:  No chest pain, palpitations, orthopnea, or dyspnea on exertion.  GASTROINTESTINAL:  See history of present illness  GENITOURINARY:  No dysuria or hematuria.  MUSCULOSKELETAL:  Mild back pain which is improving  NEUROLOGIC:  No dizziness, loss of consciousness, or seizures.  PSYCHIATRIC:  Anxiety which has improved    Vitals:    07/19/17 0842   BP: 145/74   Pulse: 56   Resp: 12   Temp: 98.2 °F (36.8 °C)   TempSrc: Oral   SpO2: 100%   Weight: 134 lb (60.8 kg)   Height: 62.2\" (158 cm)   PainSc:   4   PainLoc: Back  Comment: left side of back, x few months (degenerative disc       PHYSICAL EXAMINATION:  GENERAL:  Well-developed well-nourished female; awake, alert and oriented, in no acute distress.  SKIN:  Warm and dry, without rashes, purpura, or petechiae.  HEAD:  Normocephalic, atraumatic.  EYES:  Pupils equal, round and reactive to light.  Extraocular movements intact.  Conjunctivae normal.  EARS:  Hearing intact.  NOSE:  Septum midline.  No excoriations or nasal discharge.  MOUTH:  No stomatitis or ulcers.  Lips are normal.  THROAT:  Oropharynx without lesions or exudates.  NECK:  Supple with good range of motion; no thyromegaly or masses; no JVD or bruits.  LYMPHATICS:  No cervical, supraclavicular, axillary, or inguinal lymphadenopathy.  CHEST:  Lungs are clear to auscultation bilaterally.  No wheezes, rales, or rhonchi.  A Mediport is present as benign in appearance.  BREASTS:  Both breasts were examined today.  Stable postsurgical changes in the upper outer quadrant of the left " breast.  No nodules or nipple discharge.  The right breast is normal.  HEART:  Regular rate; normal rhythm.  No murmurs, gallops or rubs.  ABDOMEN:  Soft, non-tender, non-distended.  Normal active bowel sounds.  No organomegaly.  EXTREMITIES:  No clubbing, cyanosis, or edema.  NEUROLOGICAL:  No focal neurologic deficits.    DIAGNOSTIC DATA:  Lab Results   Component Value Date    WBC 4.63 07/14/2017    HGB 11.7 (L) 07/14/2017    HCT 34.3 (L) 07/14/2017    .0 (H) 07/14/2017     07/14/2017       IMAGING:  CT imaging of the abdomen and pelvis shows small left renal cysts.  Imaging personally reviewed.    ASSESSMENT:  This is a 62 y.o. female with:  1. History of stage IIB hormone receptor negative HER-2 overexpressed invasive ductal carcinoma of the left breast she had a lumpectomy with sentinel lymph node biopsy. She completed radiation therapy. She completed 3 cycles of adjuvant chemotherapy with Taxotere and carboplatin with Herceptin before discontinuing the medication due to poor tolerance. She subsequently completed 1 year of Herceptin as of 12/13/2016. No antiestrogen therapy is indicated. She had her therapy at the [facility]. She has transferred her care to our group for ongoing monitoring.    2.  Persistently elevated CEA level: These were obtained all she was on therapy.  I believe these were falsely elevated perhaps due to therapy as her CEA is normal now.  3.  Small left renal mass decreasing in size on her last imaging.  Significance undetermined.  Repeat imaging shows proteinaceous cysts.  We do not plan to repeat any further imaging.  4.  Anxiety, improving  5.  Recent abnormalities on breast imaging resulting in a biopsy of the left breast showing fat necrosis.  Repeat diagnostic imaging of the left breast in September will be ordered today.  6.  Mediport in the right subclavian area: She was to have this removed by Dr. Casie Abrams but canceled this secondary to the  abnormal breast imaging and biopsy.  We will flush her port today.  I again advised her to follow-up with Dr. Abrams to have this removed.    PLAN:  1.  Diagnostic mammogram with ultrasound of the left breast in about 2 months.  This was ordered today.  This will be performed at [facility] in [place].  2.  I will see her back in 6 months for follow-up with a CBC and a breast exam.  3.  She'll follow-up with her surgeon for Mediport removal

## 2017-09-15 ENCOUNTER — TELEPHONE (OUTPATIENT)
Dept: ONCOLOGY | Facility: HOSPITAL | Age: 62
End: 2017-09-15

## 2017-09-15 NOTE — TELEPHONE ENCOUNTER
Received a call from Chyna at [facility] regarding this pt, she needs orders to be faxed to [phone] for the Diagnostic L Breast mammogram with ultrasound.  Per Dr. Taylor' s note July 14th these orders were placed by him.  Orders faxed to Chyna at [facility]

## 2018-02-14 ENCOUNTER — LAB (OUTPATIENT)
Dept: OTHER | Facility: HOSPITAL | Age: 63
End: 2018-02-14

## 2018-02-14 ENCOUNTER — OFFICE VISIT (OUTPATIENT)
Dept: ONCOLOGY | Facility: CLINIC | Age: 63
End: 2018-02-14

## 2018-02-14 VITALS
WEIGHT: 139 LBS | OXYGEN SATURATION: 99 % | HEIGHT: 64 IN | DIASTOLIC BLOOD PRESSURE: 92 MMHG | SYSTOLIC BLOOD PRESSURE: 163 MMHG | HEART RATE: 71 BPM | RESPIRATION RATE: 16 BRPM | BODY MASS INDEX: 23.73 KG/M2 | TEMPERATURE: 97.7 F

## 2018-02-14 DIAGNOSIS — Z85.3 HISTORY OF BREAST CANCER: Primary | ICD-10-CM

## 2018-02-14 DIAGNOSIS — Z85.3 HISTORY OF BREAST CANCER: ICD-10-CM

## 2018-02-14 LAB
BASOPHILS # BLD AUTO: 0.05 10*3/MM3 (ref 0–0.2)
BASOPHILS NFR BLD AUTO: 0.7 % (ref 0–1.5)
DEPRECATED RDW RBC AUTO: 44.7 FL (ref 37–54)
EOSINOPHIL # BLD AUTO: 0.17 10*3/MM3 (ref 0–0.7)
EOSINOPHIL NFR BLD AUTO: 2.4 % (ref 0.3–6.2)
ERYTHROCYTE [DISTWIDTH] IN BLOOD BY AUTOMATED COUNT: 12.4 % (ref 11.7–13)
HCT VFR BLD AUTO: 45.5 % (ref 35.6–45.5)
HGB BLD-MCNC: 15.9 G/DL (ref 11.9–15.5)
IMM GRANULOCYTES # BLD: 0.03 10*3/MM3 (ref 0–0.03)
IMM GRANULOCYTES NFR BLD: 0.4 % (ref 0–0.5)
LYMPHOCYTES # BLD AUTO: 2.52 10*3/MM3 (ref 0.9–4.8)
LYMPHOCYTES NFR BLD AUTO: 35.2 % (ref 19.6–45.3)
MCH RBC QN AUTO: 33.7 PG (ref 26.9–32)
MCHC RBC AUTO-ENTMCNC: 34.9 G/DL (ref 32.4–36.3)
MCV RBC AUTO: 96.4 FL (ref 80.5–98.2)
MONOCYTES # BLD AUTO: 0.72 10*3/MM3 (ref 0.2–1.2)
MONOCYTES NFR BLD AUTO: 10.1 % (ref 5–12)
NEUTROPHILS # BLD AUTO: 3.67 10*3/MM3 (ref 1.9–8.1)
NEUTROPHILS NFR BLD AUTO: 51.2 % (ref 42.7–76)
NRBC BLD MANUAL-RTO: 0 /100 WBC (ref 0–0)
PLATELET # BLD AUTO: 215 10*3/MM3 (ref 140–500)
PMV BLD AUTO: 10.6 FL (ref 6–12)
RBC # BLD AUTO: 4.72 10*6/MM3 (ref 3.9–5.2)
WBC NRBC COR # BLD: 7.16 10*3/MM3 (ref 4.5–10.7)

## 2018-02-14 PROCEDURE — 85025 COMPLETE CBC W/AUTO DIFF WBC: CPT | Performed by: INTERNAL MEDICINE

## 2018-02-14 PROCEDURE — 36415 COLL VENOUS BLD VENIPUNCTURE: CPT

## 2018-02-14 PROCEDURE — 99213 OFFICE O/P EST LOW 20 MIN: CPT | Performed by: INTERNAL MEDICINE

## 2018-02-14 RX ORDER — INFLUENZA A VIRUS A/SINGAPORE/GP1908/2015 IVR-180 (H1N1) ANTIGEN (MDCK CELL DERIVED, PROPIOLACTONE INACTIVATED), INFLUENZA A VIRUS A/NORTH CAROLINA/04/2016 (H3N2) HEMAGGLUTININ ANTIGEN (MDCK CELL DERIVED, PROPIOLACTONE INACTIVATED), INFLUENZA B VIRUS B/IOWA/06/2017 HEMAGGLUTININ ANTIGEN (MDCK CELL DERIVED, PROPIOLACTONE INACTIVATED), INFLUENZA B VIRUS B/SINGAPORE/INFTT-16-0610/2016 HEMAGGLUTININ ANTIGEN (MDCK CELL DERIVED, PROPIOLACTONE INACTIVATED) 15; 15; 15; 15 UG/.5ML; UG/.5ML; UG/.5ML; UG/.5ML
INJECTION, SUSPENSION INTRAMUSCULAR
Refills: 0 | COMMUNITY
Start: 2018-01-20

## 2018-02-14 NOTE — PROGRESS NOTES
[facility] GROUP OUTPATIENT FOLLOW UP CLINIC VISIT    REASON FOR FOLLOW-UP:    1.  History of stage IIB ER/ME negative, HER-2 overexpressed ductal carcinoma of the left breast with 1 node positive for a micrometastasis. Following lumpectomy, she completed adjuvant radiation and three cycles chemotherapy with Taxotere, carboplatin, and Herceptin and subsequently completed 1 year of Herceptin. She was followed by Dr. David Harding with the [facility].  2.  Abnormal mammogram at [facility] on 2/23/2017 with diagnostic imaging recommended.  Diagnostic left mammogram performed on 3/21/2017.  Some architectural distortion seen in the upper outer left breast with 3 groupings of calcifications within the lumpectomy bed.  Biopsy was recommended and performed showing fat necrosis.  3.  Persistently elevated CEA level on prior labs at [place].  CEA was normal here at 1.14 on 2/8/2017.      HISTORY OF PRESENT ILLNESS:  Pricilla Molina is a 63 y.o. female who returns today for follow up of the above issues.  She continues to do well.  She had her Mediport removed at [facility].  She had a diagnostic mammogram of the left breast performed on 10/11/2017 with annual routine screening mammogram recommended and this is due to be done in the near future.  Energy level is excellent.  Denies new problems with her breasts.    ONCOLOGIC HISTORY:  She had diagnostic left breast imaging on 10/9/2015 that showed a 1.9 cm mass in the upper outer quadrant of the left breast. A biopsy was performed on 10/19/2015 showing invasive ductal carcinoma, grade 3 and poorly differentiated. Estrogen receptor and progesterone receptor were negative with a Ki-67 of 30%. HER-2 was overexpressed by FISH. On 10/29/2015 she had a left breast lumpectomy with sentinel lymph node biopsy. This showed a 22 mm grade 3 poorly differentiated invasive ductal carcinoma with a microscopic focus of  high-grade DCIS noted. Lymphovascular invasion negative. Three lymph nodes examined with one lymph node positive for a micrometastasis. pT2, pN1 (mi). On 11/11/2015 Mediport was placed by Dr. Casie Abrams. On 12/3/2015 she initiated adjuvant chemotherapy with Taxotere, carboplatin, and Herceptin. She was hospitalized at [facility] for dehydration, hypotension, and acute kidney injury from 12/14/2015 through 12/15/2015. Carboplatin dose was decreased by 20%. She received cycle #3 on 1/19/2016. She was admitted to [facility] on 1/26/2016 through 2/1/2016. She completed radiation from 4/27/2016 through 6/14/2016. From 7/17/2016 through 7/19/2016 she was admitted to [facility] for chest pain and suicidal ideation. She had a heart catheterization that showed moderate coronary artery disease in the mid LAD of 60-70% and severity. On 7/22/2016 she had CT imaging of the chest abdomen and pelvis that showed no evidence for metastatic disease. She completed Herceptin on 11/15/2016. CT imaging on 12/7/2016 showed no evidence for metastatic disease. A lesion in the left upper renal pole was noted, suspected to represent a hemorrhagic cyst.  This had decreased in size from 1.9 cm to 1.2 cm.    She completed Herceptin as of 12/13/2016.    She had a bone scan on 12/30/2016 that showed no evidence for metastatic disease.     She had abnormal imaging of the left breast in March, 2017 ultimately with a biopsy showing fat necrosis.      As of 7/19/2017 she has not had her mediport removed as she has not followed up with Dr. Abrams.      She finally had her Mediport removed at [facility].    PAST MEDICAL, SURGICAL, FAMILY, AND SOCIAL HISTORIES were reviewed with the patient and in the electronic medical record, and were updated if indicated.    ALLERGIES:  Allergies   Allergen Reactions   • Codeine Nausea Only     STOMACH ACHE   •  "Morphine Hives   • Sulfa Antibiotics Other (See Comments)     AS A CHILD       MEDICATIONS:  The medication list has been reviewed with the patient by the medical assistant, and the list has been updated in the electronic medical record, which I reviewed.  Medication dosages and frequencies were confirmed to be accurate.    REVIEW OF SYSTEMS:  PAIN:  See Vital Signs below.  GENERAL:  No fevers, chills, night sweats, or unintended weight loss.  SKIN:  No rashes or non-healing lesions.  HEME/LYMPH:  No abnormal bleeding.  No palpable lymphadenopathy.  EYES:  No vision changes or diplopia.  ENT:  No sore throat or difficulty swallowing.  RESPIRATORY:  No cough, shortness of breath, hemoptysis, or wheezing.  CARDIOVASCULAR:  No chest pain, palpitations, orthopnea, or dyspnea on exertion.  GASTROINTESTINAL:  No complaint of nausea vomiting or diarrhea today.  GENITOURINARY:  No dysuria or hematuria.  MUSCULOSKELETAL:  Did not complain of back pain today  NEUROLOGIC:  No dizziness, loss of consciousness, or seizures.  PSYCHIATRIC:  Anxiety which has improved    Vitals:    02/14/18 0940   BP: 163/92   Pulse: 71   Resp: 16   Temp: 97.7 °F (36.5 °C)   TempSrc: Oral   SpO2: 99%   Weight: 63 kg (139 lb)   Height: 161.5 cm (63.58\")  Comment: new ht   PainSc:   3   PainLoc: Hip       PHYSICAL EXAMINATION:  GENERAL:  Well-developed well-nourished female; awake, alert and oriented, in no acute distress.  SKIN:  Warm and dry, without rashes, purpura, or petechiae.  HEAD:  Normocephalic, atraumatic.  EYES:  Pupils equal, round and reactive to light.  Extraocular movements intact.  Conjunctivae normal.  EARS:  Hearing intact.  NOSE:  Septum midline.  No excoriations or nasal discharge.  MOUTH:  No stomatitis or ulcers.  Lips are normal.  THROAT:  Oropharynx without lesions or exudates.  NECK:  Supple with good range of motion; no thyromegaly or masses; no JVD or bruits.  LYMPHATICS:  No cervical, supraclavicular, axillary, or " inguinal lymphadenopathy.  CHEST:  Lungs are clear to auscultation bilaterally.  No wheezes, rales, or rhonchi.  A Mediport is present as benign in appearance.  BREASTS:  Both breasts examined today.  The right breast is normal.  No nodules or nipple discharge.  Somewhat thickened skin of the left nipple.  Postsurgical changes in the upper outer quadrant of the left breast are stable.  No new nodules or nipple discharge.  HEART:  Regular rate; normal rhythm.  No murmurs, gallops or rubs.  EXTREMITIES:  No clubbing, cyanosis, or edema.  NEUROLOGICAL:  No focal neurologic deficits.    DIAGNOSTIC DATA:  Lab Results   Component Value Date    WBC 7.16 02/14/2018    HGB 15.9 (H) 02/14/2018    HCT 45.5 02/14/2018    MCV 96.4 02/14/2018     02/14/2018       IMAGING:  Mammogram on 10/11/2017 negative.  Routine annual examinations recommended.    ASSESSMENT:  This is a 63 y.o. female with:  1. History of stage IIB hormone receptor negative HER-2 overexpressed invasive ductal carcinoma of the left breast she had a lumpectomy with sentinel lymph node biopsy. She completed radiation therapy. She completed 3 cycles of adjuvant chemotherapy with Taxotere and carboplatin with Herceptin before discontinuing the medication due to poor tolerance. She subsequently completed 1 year of Herceptin as of 12/13/2016. No antiestrogen therapy is indicated. She had her therapy at the [facility]. She has transferred her care to our group for ongoing monitoring.    2.  Persistently elevated CEA level: These were obtained all she was on therapy.  I believe these were falsely elevated perhaps due to therapy as her CEA is normal now.  3.  Small left renal mass decreasing in size on her last imaging.  Significance undetermined.  Repeat imaging shows proteinaceous cysts.  We do not plan to repeat any further imaging.  4.  Anxiety, improving  5.  Recent abnormalities on breast imaging resulting in a biopsy of the left breast showing  fat necrosis.  Repeat diagnostic imaging of the left breast was done in October with recommendations for annual routine screening.  6.  Mediport which has been removed.    PLAN:  1.  Annual bilateral screening mammogram ordered today.  2.  I will see her back in 6 months for follow-up with a CBC and a breast exam.

## 2018-08-03 ENCOUNTER — LAB (OUTPATIENT)
Dept: OTHER | Facility: HOSPITAL | Age: 63
End: 2018-08-03

## 2018-08-03 ENCOUNTER — OFFICE VISIT (OUTPATIENT)
Dept: ONCOLOGY | Facility: CLINIC | Age: 63
End: 2018-08-03

## 2018-08-03 VITALS
OXYGEN SATURATION: 95 % | HEART RATE: 63 BPM | RESPIRATION RATE: 16 BRPM | WEIGHT: 141 LBS | DIASTOLIC BLOOD PRESSURE: 95 MMHG | HEIGHT: 64 IN | SYSTOLIC BLOOD PRESSURE: 140 MMHG | TEMPERATURE: 98.2 F | BODY MASS INDEX: 24.07 KG/M2

## 2018-08-03 DIAGNOSIS — C50.412 MALIGNANT NEOPLASM OF UPPER-OUTER QUADRANT OF LEFT FEMALE BREAST, UNSPECIFIED ESTROGEN RECEPTOR STATUS (HCC): Primary | ICD-10-CM

## 2018-08-03 DIAGNOSIS — Z85.3 HISTORY OF BREAST CANCER: Primary | ICD-10-CM

## 2018-08-03 LAB
BASOPHILS # BLD AUTO: 0.07 10*3/MM3 (ref 0–0.2)
BASOPHILS NFR BLD AUTO: 1.2 % (ref 0–1.5)
DEPRECATED RDW RBC AUTO: 40.9 FL (ref 37–54)
EOSINOPHIL # BLD AUTO: 0.18 10*3/MM3 (ref 0–0.7)
EOSINOPHIL NFR BLD AUTO: 3.1 % (ref 0.3–6.2)
ERYTHROCYTE [DISTWIDTH] IN BLOOD BY AUTOMATED COUNT: 11.9 % (ref 11.7–13)
HCT VFR BLD AUTO: 37.7 % (ref 35.6–45.5)
HGB BLD-MCNC: 13.3 G/DL (ref 11.9–15.5)
IMM GRANULOCYTES # BLD: 0.03 10*3/MM3 (ref 0–0.03)
IMM GRANULOCYTES NFR BLD: 0.5 % (ref 0–0.5)
LYMPHOCYTES # BLD AUTO: 2.29 10*3/MM3 (ref 0.9–4.8)
LYMPHOCYTES NFR BLD AUTO: 39.1 % (ref 19.6–45.3)
MCH RBC QN AUTO: 33.2 PG (ref 26.9–32)
MCHC RBC AUTO-ENTMCNC: 35.3 G/DL (ref 32.4–36.3)
MCV RBC AUTO: 94 FL (ref 80.5–98.2)
MONOCYTES # BLD AUTO: 0.66 10*3/MM3 (ref 0.2–1.2)
MONOCYTES NFR BLD AUTO: 11.3 % (ref 5–12)
NEUTROPHILS # BLD AUTO: 2.63 10*3/MM3 (ref 1.9–8.1)
NEUTROPHILS NFR BLD AUTO: 44.8 % (ref 42.7–76)
NRBC BLD MANUAL-RTO: 0 /100 WBC (ref 0–0)
PLATELET # BLD AUTO: 186 10*3/MM3 (ref 140–500)
PMV BLD AUTO: 11.4 FL (ref 6–12)
RBC # BLD AUTO: 4.01 10*6/MM3 (ref 3.9–5.2)
WBC NRBC COR # BLD: 5.86 10*3/MM3 (ref 4.5–10.7)

## 2018-08-03 PROCEDURE — 85025 COMPLETE CBC W/AUTO DIFF WBC: CPT | Performed by: INTERNAL MEDICINE

## 2018-08-03 PROCEDURE — 99213 OFFICE O/P EST LOW 20 MIN: CPT | Performed by: INTERNAL MEDICINE

## 2018-08-03 PROCEDURE — 36415 COLL VENOUS BLD VENIPUNCTURE: CPT

## 2018-08-03 NOTE — PROGRESS NOTES
[facility] OUTPATIENT FOLLOW UP CLINIC VISIT    REASON FOR FOLLOW-UP:    1.  History of stage IIB ER/MA negative, HER-2 overexpressed ductal carcinoma of the left breast with 1 node positive for a micrometastasis. Following lumpectomy, she completed adjuvant radiation and three cycles chemotherapy with Taxotere, carboplatin, and Herceptin and subsequently completed 1 year of Herceptin. She was followed by Dr. David Harding with the [facility].  2.  Abnormal mammogram at [facility] on 2/23/2017 with diagnostic imaging recommended.  Diagnostic left mammogram performed on 3/21/2017.  Some architectural distortion seen in the upper outer left breast with 3 groupings of calcifications within the lumpectomy bed.  Biopsy was recommended and performed showing fat necrosis.  3.  Persistently elevated CEA level on prior labs at [place].  CEA was normal here at 1.14 on 2/8/2017.      HISTORY OF PRESENT ILLNESS:  Pricilla Molina is a 63 y.o. female who returns today for follow up of the above issues.      She continues to do well.  She has some tenderness in the left breast but denies any other new issues with the breasts.  She is having some mild low back pain but has been more active as her  has been ill recently.  Otherwise she states that she feels very well.    ONCOLOGIC HISTORY:  She had diagnostic left breast imaging on 10/9/2015 that showed a 1.9 cm mass in the upper outer quadrant of the left breast. A biopsy was performed on 10/19/2015 showing invasive ductal carcinoma, grade 3 and poorly differentiated. Estrogen receptor and progesterone receptor were negative with a Ki-67 of 30%. HER-2 was overexpressed by FISH. On 10/29/2015 she had a left breast lumpectomy with sentinel lymph node biopsy. This showed a 22 mm grade 3 poorly differentiated invasive ductal carcinoma with a microscopic focus of high-grade DCIS noted. Lymphovascular invasion negative. Three  lymph nodes examined with one lymph node positive for a micrometastasis. pT2, pN1 (mi). On 11/11/2015 Mediport was placed by Dr. Casie Abrams. On 12/3/2015 she initiated adjuvant chemotherapy with Taxotere, carboplatin, and Herceptin. She was hospitalized at [facility] for dehydration, hypotension, and acute kidney injury from 12/14/2015 through 12/15/2015. Carboplatin dose was decreased by 20%. She received cycle #3 on 1/19/2016. She was admitted to [facility] on 1/26/2016 through 2/1/2016. She completed radiation from 4/27/2016 through 6/14/2016. From 7/17/2016 through 7/19/2016 she was admitted to [facility] for chest pain and suicidal ideation. She had a heart catheterization that showed moderate coronary artery disease in the mid LAD of 60-70% and severity. On 7/22/2016 she had CT imaging of the chest abdomen and pelvis that showed no evidence for metastatic disease. She completed Herceptin on 11/15/2016. CT imaging on 12/7/2016 showed no evidence for metastatic disease. A lesion in the left upper renal pole was noted, suspected to represent a hemorrhagic cyst.  This had decreased in size from 1.9 cm to 1.2 cm.    She completed Herceptin as of 12/13/2016.    She had a bone scan on 12/30/2016 that showed no evidence for metastatic disease.     She had abnormal imaging of the left breast in March, 2017 ultimately with a biopsy showing fat necrosis.      As of 7/19/2017 she has not had her mediport removed as she has not followed up with Dr. Abrams.      She finally had her Mediport removed at [facility].    PAST MEDICAL, SURGICAL, FAMILY, AND SOCIAL HISTORIES were reviewed with the patient and in the electronic medical record, and were updated if indicated.    ALLERGIES:  Allergies   Allergen Reactions   • Codeine Nausea Only     STOMACH ACHE   • Morphine Hives   • Sulfa Antibiotics Other (See Comments)     AS A  "CHILD       MEDICATIONS:  The medication list has been reviewed with the patient by the medical assistant, and the list has been updated in the electronic medical record, which I reviewed.  Medication dosages and frequencies were confirmed to be accurate.    REVIEW OF SYSTEMS:  PAIN:  See Vital Signs below.  GENERAL:  No fevers, chills, night sweats, or unintended weight loss.  SKIN:  No rashes or non-healing lesions.  HEME/LYMPH:  No abnormal bleeding.  No palpable lymphadenopathy.  EYES:  No vision changes or diplopia.  ENT:  No sore throat or difficulty swallowing.  RESPIRATORY:  No cough, shortness of breath, hemoptysis, or wheezing.  CARDIOVASCULAR:  No chest pain, palpitations, orthopnea, or dyspnea on exertion.  GASTROINTESTINAL:  No complaint of nausea vomiting or diarrhea today.  GENITOURINARY:  No dysuria or hematuria.  MUSCULOSKELETAL:  Mild low back pain which is chronic  NEUROLOGIC:  No dizziness, loss of consciousness, or seizures.  PSYCHIATRIC:  Anxiety which has improved    Vitals:    08/03/18 0959   BP: 140/95   Pulse: 63   Resp: 16   Temp: 98.2 °F (36.8 °C)   TempSrc: Oral   SpO2: 95%   Weight: 64 kg (141 lb)   Height: 161.5 cm (63.58\")   PainSc:   2   PainLoc: Back       PHYSICAL EXAMINATION:  GENERAL:  Well-developed well-nourished female; awake, alert and oriented, in no acute distress.  SKIN:  Warm and dry, without rashes, purpura, or petechiae.  HEAD:  Normocephalic, atraumatic.  EYES:  Pupils equal, round and reactive to light.  Extraocular movements intact.  Conjunctivae normal.  EARS:  Hearing intact.  NOSE:  Septum midline.  No excoriations or nasal discharge.  MOUTH:  No stomatitis or ulcers.  Lips are normal.  THROAT:  Oropharynx without lesions or exudates.  NECK:  Supple with good range of motion; no thyromegaly or masses; no JVD or bruits.  LYMPHATICS:  No cervical, supraclavicular, axillary, or inguinal lymphadenopathy.  CHEST:  Lungs are clear to auscultation bilaterally.  No " wheezes, rales, or rhonchi.  A Mediport is present as benign in appearance.  BREASTS:  Both breasts examined today.  The right breast is normal.  No nodules or nipple discharge.  Somewhat thickened skin of the left nipple, stable.  Postsurgical changes in the upper outer quadrant of the left breast are stable.  No new nodules or nipple discharge.  No axillary adenopathy.  HEART:  Regular rate; normal rhythm.  No murmurs, gallops or rubs.  EXTREMITIES:  No clubbing, cyanosis, or edema.  NEUROLOGICAL:  No focal neurologic deficits.    DIAGNOSTIC DATA:  Results for orders placed or performed in visit on 08/03/18   CBC Auto Differential   Result Value Ref Range    WBC 5.86 4.50 - 10.70 10*3/mm3    RBC 4.01 3.90 - 5.20 10*6/mm3    Hemoglobin 13.3 11.9 - 15.5 g/dL    Hematocrit 37.7 35.6 - 45.5 %    MCV 94.0 80.5 - 98.2 fL    MCH 33.2 (H) 26.9 - 32.0 pg    MCHC 35.3 32.4 - 36.3 g/dL    RDW 11.9 11.7 - 13.0 %    RDW-SD 40.9 37.0 - 54.0 fl    MPV 11.4 6.0 - 12.0 fL    Platelets 186 140 - 500 10*3/mm3    Neutrophil % 44.8 42.7 - 76.0 %    Lymphocyte % 39.1 19.6 - 45.3 %    Monocyte % 11.3 5.0 - 12.0 %    Eosinophil % 3.1 0.3 - 6.2 %    Basophil % 1.2 0.0 - 1.5 %    Immature Grans % 0.5 0.0 - 0.5 %    Neutrophils, Absolute 2.63 1.90 - 8.10 10*3/mm3    Lymphocytes, Absolute 2.29 0.90 - 4.80 10*3/mm3    Monocytes, Absolute 0.66 0.20 - 1.20 10*3/mm3    Eosinophils, Absolute 0.18 0.00 - 0.70 10*3/mm3    Basophils, Absolute 0.07 0.00 - 0.20 10*3/mm3    Immature Grans, Absolute 0.03 0.00 - 0.03 10*3/mm3    nRBC 0.0 0.0 - 0.0 /100 WBC       IMAGING:  Mammogram on 4/3/2018 negative.  Routine annual examinations recommended.    ASSESSMENT:  This is a 63 y.o. female with:  1. History of stage IIB hormone receptor negative HER-2 overexpressed invasive ductal carcinoma of the left breast she had a lumpectomy with sentinel lymph node biopsy. She completed radiation therapy. She completed 3 cycles of adjuvant chemotherapy with Taxotere and  carboplatin with Herceptin before discontinuing the medication due to poor tolerance. She subsequently completed 1 year of Herceptin as of 12/13/2016. No antiestrogen therapy is indicated. She had her therapy at the [facility]. She has transferred her care to our group for ongoing monitoring.    2.  Persistently elevated CEA level: These were obtained all she was on therapy.  I believe these were falsely elevated perhaps due to therapy as her CEA is normal now.  3.  Small left renal mass decreasing in size on her last imaging.  Significance undetermined.  Repeat imaging shows proteinaceous cysts.  We do not plan to repeat any further imaging.  4.  Anxiety, improving  5.  Recent abnormalities on breast imaging resulting in a biopsy of the left breast showing fat necrosis.  Repeat diagnostic imaging of the left breast was done in April with recommendations for annual routine screening.  6.  Mediport which has been removed.    PLAN:  1.  I will see her back in 6 months for follow-up with a CBC and a breast exam.  2.  Mammogram next April.

## 2019-01-11 ENCOUNTER — APPOINTMENT (OUTPATIENT)
Dept: ONCOLOGY | Facility: CLINIC | Age: 64
End: 2019-01-11

## 2019-01-11 ENCOUNTER — APPOINTMENT (OUTPATIENT)
Dept: OTHER | Facility: HOSPITAL | Age: 64
End: 2019-01-11